# Patient Record
Sex: MALE | Race: WHITE
[De-identification: names, ages, dates, MRNs, and addresses within clinical notes are randomized per-mention and may not be internally consistent; named-entity substitution may affect disease eponyms.]

---

## 2019-03-04 ENCOUNTER — HOSPITAL ENCOUNTER (OUTPATIENT)
Dept: HOSPITAL 61 - KCIC | Age: 49
Discharge: HOME | End: 2019-03-04
Attending: PHYSICIAN ASSISTANT
Payer: OTHER GOVERNMENT
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
## 2019-03-04 DIAGNOSIS — Z88.8: ICD-10-CM

## 2019-03-04 DIAGNOSIS — M75.41: ICD-10-CM

## 2019-03-04 DIAGNOSIS — S43.431A: Primary | ICD-10-CM
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
## 2019-03-04 PROCEDURE — 73222 MRI JOINT UPR EXTREM W/DYE: CPT

## 2019-03-04 PROCEDURE — 23350 INJECTION FOR SHOULDER X-RAY: CPT

## 2019-03-04 PROCEDURE — 73040 CONTRAST X-RAY OF SHOULDER: CPT

## 2019-03-04 PROCEDURE — A9585 GADOBUTROL INJECTION: HCPCS

## 2019-03-04 NOTE — [facility]
MRI arthrogram of the right shoulder

 

HISTORY: Right shoulder pain, numbness down right arm for 6 or 7 months. 

Chronic impingement

 

TECHNIQUE: Routine multiplanar sequences are obtained.

 

FINDINGS:

Acromioclavicular joint is intact.

 

No evidence of a rotator cuff tear. Trace subdeltoid bursal fluid without 

contrast accumulation.

 

Articular cartilage intact. Small superior labral tear. Biceps tendon 

intact.

 

No aggressive bone destruction or acute fracture. No acute soft tissue 

abnormality.

 

IMPRESSION:

1. Superior labral tear.

2. No rotator cuff tear.

 

Electronically signed by: Robinson Root MD (3/4/2019 4:36 PM) [facility]

## 2019-03-04 NOTE — [facility]
Fluoroscopically guided injection of the right shoulder prior to right MR 

arthrogram 3/4/2019

 

Clinical history: Chronic right shoulder pain and right arm numbness.

 

Technique: After the risks and benefits of the procedure were explained to

the patient, written informed consent was obtained. The anterior skin 

surface of the right shoulder was prepped and draped in sterile fashion. 

1% lidocaine was used as local anesthetic. Under fluoroscopic guidance, a 

22-gauge spinal needle was advanced into the anterior aspect of the right 

glenohumeral joint. A solution containing 5 cc of 1 percent lidocaine, 5 

cc of Omnipaque 300, 10 cc of normal saline and 0.1 cc of Gadavist was 

injected into the right glenohumeral joint under fluoroscopic control. 

Following this the needle was removed and hemostasis achieved at the 

puncture site. A sterile bandage was placed on the skin puncture site. The

patient tolerated the procedure well and there were no immediate 

complications. The total fluoroscopic time for this study was 22 seconds. 

1 digital fluoroscopic captured radiograph of the right shoulder was 

obtained. The patient was then taken to MRI for further imaging.

 

Impression: Technically successful injection of the right shoulder joint 

under fluoroscopy to facilitate a MR arthrogram as outlined above.

 

Electronically signed by: Yaya Rizo MD (3/4/2019 5:08 PM) [facility]